# Patient Record
Sex: MALE | Race: BLACK OR AFRICAN AMERICAN | Employment: FULL TIME | URBAN - METROPOLITAN AREA
[De-identification: names, ages, dates, MRNs, and addresses within clinical notes are randomized per-mention and may not be internally consistent; named-entity substitution may affect disease eponyms.]

---

## 2017-02-08 ENCOUNTER — HOSPITAL ENCOUNTER (EMERGENCY)
Age: 52
Discharge: HOME | End: 2017-02-08
Attending: INTERNAL MEDICINE
Payer: COMMERCIAL

## 2017-02-08 VITALS
RESPIRATION RATE: 18 BRPM | WEIGHT: 165 LBS | TEMPERATURE: 99.6 F | SYSTOLIC BLOOD PRESSURE: 138 MMHG | HEART RATE: 94 BPM | OXYGEN SATURATION: 100 % | HEIGHT: 67 IN | DIASTOLIC BLOOD PRESSURE: 78 MMHG | BODY MASS INDEX: 25.9 KG/M2

## 2017-02-08 DIAGNOSIS — R11.2 NAUSEA, VOMITING AND DIARRHEA: Primary | ICD-10-CM

## 2017-02-08 DIAGNOSIS — R19.7 NAUSEA, VOMITING AND DIARRHEA: Primary | ICD-10-CM

## 2017-02-08 LAB
ALBUMIN SERPL BCP-MCNC: 3.7 G/DL (ref 3.4–5)
ALBUMIN/GLOB SERPL: 0.9 {RATIO} (ref 0.8–1.7)
ALP SERPL-CCNC: 73 U/L (ref 45–117)
ALT SERPL-CCNC: 30 U/L (ref 16–61)
ANION GAP BLD CALC-SCNC: 7 MMOL/L (ref 3–18)
APPEARANCE UR: CLEAR
AST SERPL W P-5'-P-CCNC: 22 U/L (ref 15–37)
BACTERIA URNS QL MICRO: ABNORMAL /HPF
BASOPHILS # BLD AUTO: 0 K/UL (ref 0–0.06)
BASOPHILS # BLD: 0 % (ref 0–2)
BILIRUB SERPL-MCNC: 0.6 MG/DL (ref 0.2–1)
BILIRUB UR QL: NEGATIVE
BUN SERPL-MCNC: 17 MG/DL (ref 7–18)
BUN/CREAT SERPL: 11 (ref 12–20)
CALCIUM SERPL-MCNC: 8.9 MG/DL (ref 8.5–10.1)
CHLORIDE SERPL-SCNC: 101 MMOL/L (ref 100–108)
CO2 SERPL-SCNC: 30 MMOL/L (ref 21–32)
COLOR UR: YELLOW
CREAT SERPL-MCNC: 1.54 MG/DL (ref 0.6–1.3)
DIFFERENTIAL METHOD BLD: ABNORMAL
EOSINOPHIL # BLD: 0 K/UL (ref 0–0.4)
EOSINOPHIL NFR BLD: 0 % (ref 0–5)
EPITH CASTS URNS QL MICRO: ABNORMAL /LPF (ref 0–5)
ERYTHROCYTE [DISTWIDTH] IN BLOOD BY AUTOMATED COUNT: 12.5 % (ref 11.6–14.5)
GLOBULIN SER CALC-MCNC: 4.3 G/DL (ref 2–4)
GLUCOSE SERPL-MCNC: 101 MG/DL (ref 74–99)
GLUCOSE UR STRIP.AUTO-MCNC: NEGATIVE MG/DL
HCT VFR BLD AUTO: 41.1 % (ref 36–48)
HGB BLD-MCNC: 13.8 G/DL (ref 13–16)
HGB UR QL STRIP: NEGATIVE
KETONES UR QL STRIP.AUTO: ABNORMAL MG/DL
LEUKOCYTE ESTERASE UR QL STRIP.AUTO: ABNORMAL
LYMPHOCYTES # BLD AUTO: 13 % (ref 21–52)
LYMPHOCYTES # BLD: 0.6 K/UL (ref 0.9–3.6)
MCH RBC QN AUTO: 29.9 PG (ref 24–34)
MCHC RBC AUTO-ENTMCNC: 33.6 G/DL (ref 31–37)
MCV RBC AUTO: 89.2 FL (ref 74–97)
MONOCYTES # BLD: 0.3 K/UL (ref 0.05–1.2)
MONOCYTES NFR BLD AUTO: 6 % (ref 3–10)
NEUTS SEG # BLD: 3.8 K/UL (ref 1.8–8)
NEUTS SEG NFR BLD AUTO: 81 % (ref 40–73)
NITRITE UR QL STRIP.AUTO: NEGATIVE
PH UR STRIP: 8 [PH] (ref 5–8)
PLATELET # BLD AUTO: 180 K/UL (ref 135–420)
PMV BLD AUTO: 10.7 FL (ref 9.2–11.8)
POTASSIUM SERPL-SCNC: 3.9 MMOL/L (ref 3.5–5.5)
PROT SERPL-MCNC: 8 G/DL (ref 6.4–8.2)
PROT UR STRIP-MCNC: 30 MG/DL
RBC # BLD AUTO: 4.61 M/UL (ref 4.7–5.5)
RBC #/AREA URNS HPF: ABNORMAL /HPF (ref 0–5)
SODIUM SERPL-SCNC: 138 MMOL/L (ref 136–145)
SP GR UR REFRACTOMETRY: >1.03 (ref 1–1.03)
UROBILINOGEN UR QL STRIP.AUTO: 2 EU/DL (ref 0.2–1)
WBC # BLD AUTO: 4.8 K/UL (ref 4.6–13.2)
WBC URNS QL MICRO: ABNORMAL /HPF (ref 0–5)

## 2017-02-08 PROCEDURE — 74011250636 HC RX REV CODE- 250/636: Performed by: PHYSICIAN ASSISTANT

## 2017-02-08 PROCEDURE — 80053 COMPREHEN METABOLIC PANEL: CPT | Performed by: PHYSICIAN ASSISTANT

## 2017-02-08 PROCEDURE — 99283 EMERGENCY DEPT VISIT LOW MDM: CPT

## 2017-02-08 PROCEDURE — 81001 URINALYSIS AUTO W/SCOPE: CPT | Performed by: INTERNAL MEDICINE

## 2017-02-08 PROCEDURE — 85025 COMPLETE CBC W/AUTO DIFF WBC: CPT | Performed by: PHYSICIAN ASSISTANT

## 2017-02-08 PROCEDURE — 96361 HYDRATE IV INFUSION ADD-ON: CPT

## 2017-02-08 PROCEDURE — 96374 THER/PROPH/DIAG INJ IV PUSH: CPT

## 2017-02-08 RX ORDER — ONDANSETRON 2 MG/ML
4 INJECTION INTRAMUSCULAR; INTRAVENOUS
Status: COMPLETED | OUTPATIENT
Start: 2017-02-08 | End: 2017-02-08

## 2017-02-08 RX ORDER — ONDANSETRON 4 MG/1
4 TABLET, ORALLY DISINTEGRATING ORAL
Qty: 12 TAB | Refills: 0 | Status: SHIPPED | OUTPATIENT
Start: 2017-02-08 | End: 2018-09-25

## 2017-02-08 RX ADMIN — SODIUM CHLORIDE 1000 ML: 900 INJECTION, SOLUTION INTRAVENOUS at 09:47

## 2017-02-08 RX ADMIN — ONDANSETRON 4 MG: 2 INJECTION INTRAMUSCULAR; INTRAVENOUS at 09:47

## 2017-02-08 NOTE — DISCHARGE INSTRUCTIONS
Nausea and Vomiting: Care Instructions  Your Care Instructions    When you are nauseated, you may feel weak and sweaty and notice a lot of saliva in your mouth. Nausea often leads to vomiting. Most of the time you do not need to worry about nausea and vomiting, but they can be signs of other illnesses. Two common causes of nausea and vomiting are stomach flu and food poisoning. Nausea and vomiting from viral stomach flu will usually start to improve within 24 hours. Nausea and vomiting from food poisoning may last from 12 to 48 hours. The doctor has checked you carefully, but problems can develop later. If you notice any problems or new symptoms, get medical treatment right away. Follow-up care is a key part of your treatment and safety. Be sure to make and go to all appointments, and call your doctor if you are having problems. It's also a good idea to know your test results and keep a list of the medicines you take. How can you care for yourself at home? · To prevent dehydration, drink plenty of fluids, enough so that your urine is light yellow or clear like water. Choose water and other caffeine-free clear liquids until you feel better. If you have kidney, heart, or liver disease and have to limit fluids, talk with your doctor before you increase the amount of fluids you drink. · Rest in bed until you feel better. · When you are able to eat, try clear soups, mild foods, and liquids until all symptoms are gone for 12 to 48 hours. Other good choices include dry toast, crackers, cooked cereal, and gelatin dessert, such as Jell-O. When should you call for help? Call 911 anytime you think you may need emergency care. For example, call if:  · You passed out (lost consciousness). Call your doctor now or seek immediate medical care if:  · You have symptoms of dehydration, such as:  ¨ Dry eyes and a dry mouth. ¨ Passing only a little dark urine.   ¨ Feeling thirstier than usual.  · You have new or worsening belly pain. · You have a new or higher fever. · You vomit blood or what looks like coffee grounds. Watch closely for changes in your health, and be sure to contact your doctor if:  · You have ongoing nausea and vomiting. · Your vomiting is getting worse. · Your vomiting lasts longer than 2 days. · You are not getting better as expected. Where can you learn more? Go to http://sho-andre.info/. Enter 25 [id number] in the search box to learn more about \"Nausea and Vomiting: Care Instructions. \"  Current as of: May 27, 2016  Content Version: 11.1  © 7339-3081 [employer]. Care instructions adapted under license by [employer] (which disclaims liability or warranty for this information). If you have questions about a medical condition or this instruction, always ask your healthcare professional. [address] any warranty or liability for your use of this information. Diarrhea: Care Instructions  Your Care Instructions    Diarrhea is loose, watery stools (bowel movements). The exact cause is often hard to find. Sometimes diarrhea is your body's way of getting rid of what caused an upset stomach. Viruses, food poisoning, and many medicines can cause diarrhea. Some people get diarrhea in response to emotional stress, anxiety, or certain foods. Almost everyone has diarrhea now and then. It usually isn't serious, and your stools will return to normal soon. The important thing to do is replace the fluids you have lost, so you can prevent dehydration. The doctor has checked you carefully, but problems can develop later. If you notice any problems or new symptoms, get medical treatment right away. Follow-up care is a key part of your treatment and safety. Be sure to make and go to all appointments, and call your doctor if you are having problems. It's also a good idea to know your test results and keep a list of the medicines you take.   How can you care for yourself at home? · Watch for signs of dehydration, which means your body has lost too much water. Dehydration is a serious condition and should be treated right away. Signs of dehydration are:  ¨ Increasing thirst and dry eyes and mouth. ¨ Feeling faint or lightheaded. ¨ Darker urine, and a smaller amount of urine than normal.  · To prevent dehydration, drink plenty of fluids, enough so that your urine is light yellow or clear like water. Choose water and other caffeine-free clear liquids until you feel better. If you have kidney, heart, or liver disease and have to limit fluids, talk with your doctor before you increase the amount of fluids you drink. · Begin eating small amounts of mild foods the next day, if you feel like it. ¨ Try yogurt that has live cultures of Lactobacillus. (Check the label.)  ¨ Avoid spicy foods, fruits, alcohol, and caffeine until 48 hours after all symptoms are gone. ¨ Avoid chewing gum that contains sorbitol. ¨ Avoid dairy products (except for yogurt with Lactobacillus) while you have diarrhea and for 3 days after symptoms are gone. · The doctor may recommend that you take over-the-counter medicine, such as loperamide (Imodium), if you still have diarrhea after 6 hours. Read and follow all instructions on the label. Do not use this medicine if you have bloody diarrhea, a high fever, or other signs of serious illness. Call your doctor if you think you are having a problem with your medicine. When should you call for help? Call 911 anytime you think you may need emergency care. For example, call if:  · You passed out (lost consciousness). · Your stools are maroon or very bloody. Call your doctor now or seek immediate medical care if:  · You are dizzy or lightheaded, or you feel like you may faint. · Your stools are black and look like tar, or they have streaks of blood. · You have new or worse belly pain.   · You have symptoms of dehydration, such as:  ¨ Dry eyes and a dry mouth. ¨ Passing only a little dark urine. ¨ Feeling thirstier than usual.  · You have a new or higher fever. Watch closely for changes in your health, and be sure to contact your doctor if:  · Your diarrhea is getting worse. · You see pus in the diarrhea. · You are not getting better after 2 days (48 hours). Where can you learn more? Go to http://sho-andre.info/. Enter S965 in the search box to learn more about \"Diarrhea: Care Instructions. \"  Current as of: May 27, 2016  Content Version: 11.1  © 1260-2750 [employer]. Care instructions adapted under license by [employer] (which disclaims liability or warranty for this information). If you have questions about a medical condition or this instruction, always ask your healthcare professional. [address] any warranty or liability for your use of this information.

## 2017-02-08 NOTE — ED TRIAGE NOTES
Patient reports he has a headache, vomiting. Sepsis Screening completed    (  )Patient meets SIRS criteria. (x  )Patient does not meet SIRS criteria.       SIRS Criteria is achieved when two or more of the following are present   Temperature < 96.8°F (36°C) or > 100.9°F (38.3°C)   Heart Rate > 90 beats per minute   Respiratory Rate > 20 beats per minute   WBC count > 12,000 or <4,000 or > 10% bands

## 2017-02-08 NOTE — ED PROVIDER NOTES
[address]  EMERGENCY DEPARTMENT HISTORY AND PHYSICAL EXAM       Date: 2/8/2017   Patient Name: Trace Mason   YOB: 1965  Medical Record Number: [id number]    History of Presenting Illness     Chief Complaint   Patient presents with    Vomiting    Headache        History Provided By:  patient    Additional History:   9:13 AM  Trace Mason is a 46 y.o. male with a h/o HTN who presents to the emergency department C/O near syncope and dizziness onset yesterday. Associated sx include HA, N/V/D, body aches, and generalized, intermittent abd pain. Pt has had contact with sick family members with similar sx. Denies urinary sx, fever, hematuria, hematochezia, melena, and hematemesis. Primary Care Provider: None   Specialist:    Past History     Past Medical History:   Past Medical History   Diagnosis Date    Hypertension         Past Surgical History:   History reviewed. No pertinent past surgical history. Family History:   History reviewed. No pertinent family history. Social History:   Social History   Substance Use Topics    Smoking status: Never Smoker    Smokeless tobacco: None    Alcohol use No        Allergies: Allergies   Allergen Reactions    Aspirin Hives        Review of Systems   Review of Systems   Constitutional: Negative for fever. Gastrointestinal: Positive for abdominal pain (generalized), diarrhea, nausea and vomiting. Genitourinary: Negative for decreased urine volume, difficulty urinating, dysuria, frequency, hematuria and urgency. Neurological: Positive for syncope (near) and headaches. All other systems reviewed and are negative. Physical Exam  Vitals:    02/08/17 0858   BP: 140/81   Pulse: 94   Resp: 16   Temp: 99.6 °F (37.6 °C)   SpO2: 100%   Weight: 74.8 kg (165 lb)   Height: 5' 7\" (1.702 m)       Physical Exam   Nursing note and vitals reviewed. Vital signs and nursing notes reviewed. CONSTITUTIONAL: Alert. Well-appearing; well-nourished; in no apparent distress. HEAD: Normocephalic; atraumatic. EYES: PERRL; Conjunctiva clear. ENT: TM's normal. External ear normal. Normal nose; no rhinorrhea. Normal pharynx. Moist mucus membranes. NECK: Supple; FROM without difficulty, non-tender; no cervical lymphadenopathy. CV: Normal S1, S2; no murmurs, rubs, or gallops. No chest wall tenderness. RESPIRATORY: Normal chest excursion with respiration; breath sounds clear and equal bilaterally; no wheezes, rhonchi, or rales. GI: Normal bowel sounds; non-distended; non-tender; no guarding or rigidity; no palpable organomegaly. No CVA tenderness. EXT: Normal ROM in all four extremities; non-tender to palpation. SKIN: Normal for age and race; warm; dry; good turgor; no apparent lesions or exudate. NEURO: A & O x3. PSYCH:  Mood and affect appropriate.        Diagnostic Study Results     Labs -      Recent Results (from the past 12 hour(s))   URINALYSIS W/ RFLX MICROSCOPIC    Collection Time: 02/08/17  9:10 AM   Result Value Ref Range    Color YELLOW      Appearance CLEAR      Specific gravity >1.030 (H) 1.005 - 1.030    pH (UA) 8.0 5.0 - 8.0      Protein 30 (A) NEG mg/dL    Glucose NEGATIVE  NEG mg/dL    Ketone TRACE (A) NEG mg/dL    Bilirubin NEGATIVE  NEG      Blood NEGATIVE  NEG      Urobilinogen 2.0 (H) 0.2 - 1.0 EU/dL    Nitrites NEGATIVE  NEG      Leukocyte Esterase TRACE (A) NEG     URINE MICROSCOPIC ONLY    Collection Time: 02/08/17  9:10 AM   Result Value Ref Range    WBC 0 to 3 0 - 5 /hpf    RBC 0 to 3 0 - 5 /hpf    Epithelial cells FEW 0 - 5 /lpf    Bacteria 1+ (A) NEG /hpf   CBC WITH AUTOMATED DIFF    Collection Time: 02/08/17  9:20 AM   Result Value Ref Range    WBC 4.8 4.6 - 13.2 K/uL    RBC 4.61 (L) 4.70 - 5.50 M/uL    HGB 13.8 13.0 - 16.0 g/dL    HCT 41.1 36.0 - 48.0 %    MCV 89.2 74.0 - 97.0 FL    MCH 29.9 24.0 - 34.0 PG    MCHC 33.6 31.0 - 37.0 g/dL    RDW 12.5 11.6 - 14.5 %    PLATELET 478 511 - 420 K/uL    MPV 10.7 9.2 - 11.8 FL    NEUTROPHILS 81 (H) 40 - 73 %    LYMPHOCYTES 13 (L) 21 - 52 %    MONOCYTES 6 3 - 10 %    EOSINOPHILS 0 0 - 5 %    BASOPHILS 0 0 - 2 %    ABS. NEUTROPHILS 3.8 1.8 - 8.0 K/UL    ABS. LYMPHOCYTES 0.6 (L) 0.9 - 3.6 K/UL    ABS. MONOCYTES 0.3 0.05 - 1.2 K/UL    ABS. EOSINOPHILS 0.0 0.0 - 0.4 K/UL    ABS. BASOPHILS 0.0 0.0 - 0.06 K/UL    DF AUTOMATED     METABOLIC PANEL, COMPREHENSIVE    Collection Time: 02/08/17  9:20 AM   Result Value Ref Range    Sodium 138 136 - 145 mmol/L    Potassium 3.9 3.5 - 5.5 mmol/L    Chloride 101 100 - 108 mmol/L    CO2 30 21 - 32 mmol/L    Anion gap 7 3.0 - 18 mmol/L    Glucose 101 (H) 74 - 99 mg/dL    BUN 17 7.0 - 18 MG/DL    Creatinine 1.54 (H) 0.6 - 1.3 MG/DL    BUN/Creatinine ratio 11 (L) 12 - 20      GFR est AA 58 (L) >60 ml/min/1.73m2    GFR est non-AA 48 (L) >60 ml/min/1.73m2    Calcium 8.9 8.5 - 10.1 MG/DL    Bilirubin, total 0.6 0.2 - 1.0 MG/DL    ALT (SGPT) 30 16 - 61 U/L    AST (SGOT) 22 15 - 37 U/L    Alk. phosphatase 73 45 - 117 U/L    Protein, total 8.0 6.4 - 8.2 g/dL    Albumin 3.7 3.4 - 5.0 g/dL    Globulin 4.3 (H) 2.0 - 4.0 g/dL    A-G Ratio 0.9 0.8 - 1.7         Radiologic Studies -  No orders to display       Medical Decision Making   I am the first provider for this patient. I reviewed the vital signs, available nursing notes, past medical history, past surgical history, family history and social history. Vital Signs-Reviewed the patient's vital signs. Patient Vitals for the past 12 hrs:   Temp Pulse Resp BP SpO2   02/08/17 0858 99.6 °F (37.6 °C) 94 16 140/81 100 %       Pulse Oximetry Analysis - Normal 100% on room air. Old Medical Records: Nursing notes.      ED Course:      Medications Given in the ED:  Medications   sodium chloride 0.9 % bolus infusion 1,000 mL (1,000 mL IntraVENous New Bag 2/8/17 0947)   ondansetron (ZOFRAN) injection 4 mg (4 mg IntraVENous Given 2/8/17 0947)        PROGRESS NOTE:  9:13 AM   Initial assessment performed. Written by Kamron Albarado, ED scribe, as dictated by Caden Andrews PA-C. PROGRESS NOTE:   10:10 AM  Pt has been re-examined by Caden Andrews PA-C. Pt is feeling better already after 500 cc of fluids and Zofran. Abd exam still benign. Will allow liter to finish and discharge home with Zofran and encourage good hand hygiene as this is likely viral gastroenteritis. He denies any urinary sx. UA shows trace leuk est, but no white cells, therefore tx not indicated. Work note provided. Written by Kamron Albarado, ED Scribe, as dictated by Caden Andrews PA-C. Discharge Note:  10:10 AM  Patient has no new complaints, changes, or physical findings. Care plan outlined and precautions discussed. Results were reviewed with the patient. All medications were reviewed with the patient; will d/c home with Zofran. All of pt's questions and concerns were addressed. Patient was instructed and agrees to follow up with PCP, as well as to return to the ED upon further deterioration. Patient is ready to go home. Diagnosis   Clinical Impression:   1. Nausea, vomiting and diarrhea           Follow-up Information     Follow up With Details Comments Contact Info    Malaika Keep, DO Schedule an appointment as soon as possible for a visit in 2 days Follow up with your PCP. [address] [facility]      [facility] EMERGENCY DEPT  As needed, If symptoms worsen [address] [place]  [phone]          Current Discharge Medication List      START taking these medications    Details   ondansetron (ZOFRAN ODT) 4 mg disintegrating tablet Take 1 Tab by mouth every eight (8) hours as needed for Nausea. Qty: 12 Tab, Refills: 0             _______________________________   Attestations: This note is prepared by Kamron Albarado, acting as a Scribe for Caden Andrews PA-C on 9:11 AM on 2/8/2017 .     Caden Andrews PA-C: The scribe's documentation has been prepared under my direction and personally reviewed by me in its entirety. _______________________________   I was personally available for consultation in the emergency department. I have reviewed the chart and agree with the documentation recorded by the [facility], including the assessment, treatment plan, and disposition.

## 2017-02-08 NOTE — LETTER
[facility] [place] 
[facility] EMERGENCY DEPT 
[address] [address] 
[phone] Work/School Note Date: 2/8/2017 To Whom It May concern: 
 
Sanjuana Painting was seen and treated today in the emergency room by the following provider(s): 
Attending Provider: Hong Whitehead MD 
Physician Assistant: Ivan Dean. Sanjuana Painting may return to work on Friday, 2/10/17.  
 
Sincerely, 
 
 
 
 
Jeanie Clarke PA-C

## 2018-09-25 ENCOUNTER — APPOINTMENT (OUTPATIENT)
Dept: GENERAL RADIOLOGY | Age: 53
End: 2018-09-25
Attending: PHYSICIAN ASSISTANT
Payer: OTHER MISCELLANEOUS

## 2018-09-25 ENCOUNTER — HOSPITAL ENCOUNTER (EMERGENCY)
Age: 53
Discharge: HOME | End: 2018-09-25
Attending: EMERGENCY MEDICINE | Admitting: EMERGENCY MEDICINE
Payer: OTHER MISCELLANEOUS

## 2018-09-25 VITALS
RESPIRATION RATE: 18 BRPM | DIASTOLIC BLOOD PRESSURE: 91 MMHG | BODY MASS INDEX: 28.25 KG/M2 | WEIGHT: 180 LBS | OXYGEN SATURATION: 100 % | HEIGHT: 67 IN | HEART RATE: 79 BPM | TEMPERATURE: 97.9 F | SYSTOLIC BLOOD PRESSURE: 175 MMHG

## 2018-09-25 DIAGNOSIS — S96.912A STRAIN OF LEFT ANKLE, INITIAL ENCOUNTER: ICD-10-CM

## 2018-09-25 DIAGNOSIS — S92.355A CLOSED NONDISPLACED FRACTURE OF FIFTH METATARSAL BONE OF LEFT FOOT, INITIAL ENCOUNTER: Primary | ICD-10-CM

## 2018-09-25 PROCEDURE — 75810000053 HC SPLINT APPLICATION

## 2018-09-25 PROCEDURE — 73630 X-RAY EXAM OF FOOT: CPT

## 2018-09-25 PROCEDURE — 99283 EMERGENCY DEPT VISIT LOW MDM: CPT

## 2018-09-25 PROCEDURE — 73610 X-RAY EXAM OF ANKLE: CPT

## 2018-09-25 PROCEDURE — 74011250636 HC RX REV CODE- 250/636: Performed by: PHYSICIAN ASSISTANT

## 2018-09-25 RX ORDER — FENTANYL CITRATE 50 UG/ML
50 INJECTION, SOLUTION INTRAMUSCULAR; INTRAVENOUS ONCE
Status: COMPLETED | OUTPATIENT
Start: 2018-09-25 | End: 2018-09-25

## 2018-09-25 RX ORDER — HYDROCODONE BITARTRATE AND ACETAMINOPHEN 5; 325 MG/1; MG/1
1 TABLET ORAL
Qty: 20 TAB | Refills: 0 | Status: SHIPPED | OUTPATIENT
Start: 2018-09-25

## 2018-09-25 RX ADMIN — FENTANYL CITRATE 50 MCG: 50 INJECTION, SOLUTION INTRAMUSCULAR; INTRAVENOUS at 13:07

## 2018-09-25 NOTE — ED TRIAGE NOTES
C/o left ankle pain, states he was approx 3 steps up on a ladder, ladder gave way and pt fell, denies hitting his head, scratches noted to left side of neck.

## 2018-09-25 NOTE — DISCHARGE INSTRUCTIONS
Metatarsal Fracture: Care Instructions  Your Care Instructions    A metatarsal fracture is a break or a thin, hairline crack in one of the metatarsal bones of the foot. This type of fracture usually happens from repeated stress on the bones of the foot. Or it can happen when a person jumps or changes direction quickly and twists his or her foot or ankle the wrong way. This fracture is common among dancers because their work involves a lot of jumping, and balancing and turning on one foot. Treatment depends on how bad the fracture is and where the fracture is on the bone. You may or may not have had surgery. Your doctor may have put your foot in a cast or splint to keep it stable. You may have been given crutches to use to keep weight off your foot. A metatarsal fracture may take from 6 weeks to several months to heal. It is important to give your foot time to heal completely, so that you do not hurt it again. Do not return to your usual activities until your doctor says you can. Your doctor may suggest that you get physical therapy to help regain strength and range of motion in your foot. You heal best when you take good care of yourself. Eat a variety of healthy foods, and don't smoke. Follow-up care is a key part of your treatment and safety. Be sure to make and go to all appointments, and call your doctor if you are having problems. It is also a good idea to know your test results and keep a list of the medicines you take. How can you care for yourself at home? · Be safe with medicines. Read and follow all instructions on the label. ¨ If your doctor gave you a prescription medicine for pain, take it as prescribed. ¨ If you are not taking a prescription pain medicine, ask your doctor if you can take an over-the-counter medicine. · Follow your doctor's instructions about how much weight you can put on your foot and when you can go back to your usual activities.  If you were given crutches, use them as directed. · Put ice or a cold pack on your foot for 10 to 20 minutes at a time. Try to do this every 1 to 2 hours for the next 3 days (when you are awake) or until the swelling goes down. Put a thin cloth between the ice and your skin. · Prop up your foot on a pillow when you ice it or anytime you sit or lie down for the next 3 days. Try to keep it above the level of your heart. This will help reduce swelling. Cast and splint care  · If your foot is in a cast or splint, follow the cast or splint care instructions your doctor gives you. If you have a removable fiberglass walking cast or a splint, do not take it off unless your doctor tells you to. · Keep your cast or splint dry. If you have a removable fiberglass walking cast or a splint, ask your doctor if it is okay to remove it to bathe. Your doctor may want you to keep it on as much as possible. · If you're told to keep your cast or splint on, tape a sheet of plastic to cover it when you bathe. Or ask your doctor about products that can help keep a cast or splint dry. Water under the cast or splint can cause your skin to itch and hurt. · Never cut your cast or stick anything down it to scratch an itch on your leg. When should you call for help? Call your doctor now or seek immediate medical care if:    · You have problems with your cast or splint. For example:  ¨ The skin under the cast or splint is burning or stinging. ¨ The cast or splint feels too tight. ¨ There is a lot of swelling near the cast or splint. (Some swelling is normal.)  ¨ You have a new fever.   ¨ There is drainage or a bad smell coming from the cast or splint.     · You have increased or severe pain.     · You have tingling, weakness, or numbness in your foot and toes.     · You cannot move your toes.     · Your foot turns cold or changes color.    Watch closely for changes in your health, and be sure to contact your doctor if:    · The pain does not get better day by day.     · You do not get better as expected. Where can you learn more? Go to http://sho-andre.info/. Enter ([phone] in the search box to learn more about \"Metatarsal Fracture: Care Instructions. \"  Current as of: November 29, 2017  Content Version: 11.7  © 8317-0718 [employer]. Care instructions adapted under license by [employer] (which disclaims liability or warranty for this information). If you have questions about a medical condition or this instruction, always ask your healthcare professional. [address] any warranty or liability for your use of this information.

## 2018-09-25 NOTE — LETTER
[facility] [place] 
THE [facility] EMERGENCY DEPT 
[address] [address] 
[phone] Work/School Note Date: 9/25/2018 To Whom It May concern: 
 
Aaron Sprain was seen and treated today in the emergency room by the following provider(s): 
Attending Provider: Poli Pearson MD 
Physician Assistant: KAROLINA Collins. Aaron Riddleain may return to work on 10/1/18.  
 
Sincerely, 
 
 
 
Todd Purcell PA-C

## 2018-09-25 NOTE — ED PROVIDER NOTES
EMERGENCY DEPARTMENT HISTORY AND PHYSICAL EXAM 
 
Date: 9/25/2018 Patient Name: Leoncio Elmore History of Presenting Illness Chief Complaint Patient presents with  Ankle Injury History Provided By: Patient Chief Complaint: ankle pain Duration: 1 Hours Timing:  Acute Location: left Severity: 10 out of 10 Associated Symptoms: left ankle ecchymosis and swelling Additional History (Context):  
1:00 PM 
Leoncio Elmore is a 46 y.o. male who presents to the emergency department C/O left ankle pain onset 1 hour ago. Associated symptoms include left ankle ecchymosis and swelling. Report he was standing approximately 3 steps on a ladder when it gave out and he fell with the left foot twisted inward. Pt denies fever and any other Sx or complaints. PCP: None Past History Past Medical History: 
Past Medical History:  
Diagnosis Date  Hypertension Past Surgical History: No past surgical history on file. Family History: No family history on file. Social History: 
Social History Substance Use Topics  Smoking status: Never Smoker  Smokeless tobacco: Not on file  Alcohol use No  
 
 
Allergies: Allergies Allergen Reactions  Aspirin Hives Review of Systems Review of Systems Constitutional: Negative for fever. Musculoskeletal: Positive for arthralgias (left ankle pain). Skin:  
     (+) left ankle ecchymosis  
(+) left ankle swelling All other systems reviewed and are negative. Physical Exam  
 
Vitals:  
 09/25/18 1231 BP: (!) 154/101 Pulse: 94 Resp: 18 Temp: 97.9 °F (36.6 °C) SpO2: 99% Weight: 81.6 kg (180 lb) Height: 5' 7\" (1.702 m) Physical Exam  
Constitutional: He is oriented to person, place, and time. He appears well-developed and well-nourished. Mild pain distress HENT:  
Head: Normocephalic and atraumatic. Eyes: Conjunctivae and EOM are normal. Pupils are equal, round, and reactive to light. Neck: Normal range of motion. Neck supple. Musculoskeletal: Normal range of motion. He exhibits tenderness. He exhibits no edema or deformity. Left knee: Normal.  
     Left ankle: He exhibits swelling. He exhibits normal range of motion, no ecchymosis, no deformity, no laceration and normal pulse. Tenderness. Lateral malleolus tenderness found. Achilles tendon normal.  
     Left foot: There is tenderness, bony tenderness and swelling. There is no crepitus, no deformity and no laceration. Feet: 
 
LLE: NVI Neurological: He is alert and oriented to person, place, and time. Skin: Skin is warm and dry. Psychiatric: He has a normal mood and affect. His behavior is normal.  
Nursing note and vitals reviewed. Diagnostic Study Results Labs - No results found for this or any previous visit (from the past 12 hour(s)). Radiologic Studies -  
XR ANKLE LT MIN 3 V    (Results Pending) XR FOOT LT MIN 3 V    (Results Pending) 1:57 PM 
RADIOLOGY FINDINGS Lt Ankle X-ray shows proximal 5th metatarsal fracture nondisplaced. Otherwise no acute Pending review by Radiologist 
Recorded by Roxy Philip ED Scribe, as dictated by Gopal Calderon PA-C 
 
1:57 PM 
RADIOLOGY FINDINGS Lt Foot X-ray shows proximal 5th metatarsal fracture nondisplaced. Otherwise no acute Pending review by Radiologist 
Recorded by Roxy Philip ED Scribe, as dictated by Gopal Calderon PA-C 
 
CT Results  (Last 48 hours) None CXR Results  (Last 48 hours) None Medications given in the ED- Medications  
fentaNYL citrate (PF) injection 50 mcg (50 mcg IntraNASal Given 9/25/18 4477) Medical Decision Making I am the first provider for this patient. I reviewed the vital signs, available nursing notes, past medical history, past surgical history, family history and social history. Vital Signs-Reviewed the patient's vital signs.  
 
Pulse Oximetry Analysis - 99% on RA  
 
 Records Reviewed: Nursing Notes and Old Medical Records Procedures: 
Procedures Procedure Note - Splint Assessement: 
Performed by: Xavi Spear PA-C Short leg splint to left leg assessed. Neurovascularly intact. The procedure took 1-15 minutes, and pt tolerated well. Written by Griselda Peed, ED Scribe, as dictated by Xavi Spear PA-C. 
 
ED Course:  
1:00 PM Initial assessment performed. The patients presenting problems have been discussed, and they are in agreement with the care plan formulated and outlined with them. I have encouraged them to ask questions as they arise throughout their visit. Diagnosis and Disposition DISCHARGE NOTE: 
1:58 PM 
Sandi Genao  results have been reviewed with him. He has been counseled regarding his diagnosis, treatment, and plan. He verbally conveys understanding and agreement of the signs, symptoms, diagnosis, treatment and prognosis and additionally agrees to follow up as discussed. He also agrees with the care-plan and conveys that all of his questions have been answered. I have also provided discharge instructions for him that include: educational information regarding their diagnosis and treatment, and list of reasons why they would want to return to the ED prior to their follow-up appointment, should his condition change. He has been provided with education for proper emergency department utilization. CLINICAL IMPRESSION: 
 
1. Closed nondisplaced fracture of fifth metatarsal bone of left foot, initial encounter 2. Strain of left ankle, initial encounter PLAN: 
1. D/C Home 2. Current Discharge Medication List  
  
START taking these medications Details HYDROcodone-acetaminophen (NORCO) 5-325 mg per tablet Take 1 Tab by mouth every four (4) hours as needed for Pain. Max Daily Amount: 6 Tabs. Qty: 20 Tab, Refills: 0  Associated Diagnoses: Closed nondisplaced fracture of fifth metatarsal bone of left foot, initial encounter; Strain of left ankle, initial encounter 3. Follow-up Information Follow up With Details Comments Contact Info [facility] OCCUPATIONAL MED Schedule an appointment as soon as possible for a visit For follow up with [address] [place] [address] 
[phone] [facility] EMERGENCY DEPT Go to As needed, as symptoms worsen [address] [place] [address] 
[phone]  
  
 
_______________________________ Attestations: This note is prepared by Sammi Gmoes, acting as Scribe for Todd Purcell PA-C. Todd Purcell PA-C:  The scribe's documentation has been prepared under my direction and personally reviewed by me in its entirety. I confirm that the note above accurately reflects all work, treatment, procedures, and medical decision making performed by me. 
_______________________________

## 2018-09-25 NOTE — ED NOTES
Splint done by ED Tech and RN, Yves Gleason and Wilfredo Malone RN. Girlfriend at bedside Discharge instructions reviewed with the patient with opportunity for questions given. The patient verbalized understanding. Patient armband removed and shredded. Patient in stable condition at time of discharge.

## 2018-09-25 NOTE — LETTER
[facility] [place] 
[facility] EMERGENCY DEPT 
[address] [address] 
[phone] Arthea Jim Date: 9/25/2018 Sex: male [address] [facility] [address] YOB: 1965 Age: 46 y.o. Occupational Medicine Return to Work Form          Date of Treatment:  9/25/2018 Diagnosis: ICD-10-CM ICD-9-CM 1. Closed nondisplaced fracture of fifth metatarsal bone of left foot, initial encounter S92.355A 825.25 HYDROcodone-acetaminophen (NORCO) 5-325 mg per tablet 2. Strain of left ankle, initial encounter S96.912A 845.00 HYDROcodone-acetaminophen (NORCO) 5-325 mg per tablet Instructions:  Employee must return copy of this report to Personnel and/or Supervisor. Patient Identification - Company Protocol:   
 []  Checked & Followed [x]  Not Available PART I:  Check Treatment [x]  X-ray   []  Lab  [x]  Discharge Instructions Given  [x]  Rx Given 
 []  Non-Prescription Meds  []  Sutures     []  EKG   
 []  Other (Comment):   
 
Part II:  Disposition 
 []  May Return to Regular Work Without Restrictions []  May Return to Restricted Duty as Below Explanation of RESTRICTIONS (Comment):    
 
 [x]  May NOT Return to Work until cleared by Referral Specialist or Occupational Medicine MD 
 
Part III:  Follow-Up Follow-up Information Follow up With Details Comments Contact Info [facility] OCCUPATIONAL MED Schedule an appointment as soon as possible for a visit For follow up with [address] [place] [address] [address] [place] [address] 
[phone] [facility] EMERGENCY DEPT Go to As needed, as symptoms worsen [address] [place] [address] 
[phone] Part IV: Was Workers Comp Supervisor Notified  []   Yes  [x]   No 
 
Arthea Jim was seen in the Emergency Department on 9/25/2018 by the following provider(s): 
Attending Provider: Mamta Barnard MD 
Physician Assistant: KAROLINA Luevano; ED Nurse: PLEASE CALL CASE FACILITATOR, OCCUPATIONAL MEDICINE  
[phone] TO SCHEDULE AN APPOINTMENT Referral Physicians: Emi Arnold MD 
[id number]. [address] [facility]. [address], [address]   [address], [address] Phone:  [phone]; Fax:  [phone] ORTHOPEDICS [address] [facility] [facility] [facility]. [address]    [address], [address] 
[phone] [facility] [address] [facility], [address] 
[phone]

## 2018-09-25 NOTE — LETTER
[facility] [place] 
THE [facility] EMERGENCY DEPT 
[address] [id number] [phone] Work/School Note Date: 9/25/2018 To Whom It May concern: 
 
Tao Perdomo was seen and treated today in the emergency room by the following provider(s): 
Attending Provider: Jude Joy MD 
Physician Assistant: KAROLINA Ugarte. Ms. Charanjit Sheikh may return to work on 9/26/18.  
 
Sincerely, 
 
 
 
 
Malini Ahn PA-C

## 2019-07-02 ENCOUNTER — HOSPITAL ENCOUNTER (OUTPATIENT)
Dept: LAB | Age: 54
Discharge: HOME | End: 2019-07-02
Payer: OTHER MISCELLANEOUS

## 2019-07-02 LAB
25(OH)D3 SERPL-MCNC: 23 NG/ML (ref 30–100)
CALCIUM SERPL-MCNC: 9 MG/DL (ref 8.5–10.1)

## 2019-07-02 PROCEDURE — 82310 ASSAY OF CALCIUM: CPT

## 2019-07-02 PROCEDURE — 36415 COLL VENOUS BLD VENIPUNCTURE: CPT

## 2019-07-02 PROCEDURE — 82306 VITAMIN D 25 HYDROXY: CPT

## 2019-07-03 LAB
FAX TO INFO,FAXT: NORMAL
FAX TO NUMBER,FAXN: NORMAL

## 2022-12-05 ENCOUNTER — APPOINTMENT (OUTPATIENT)
Dept: GENERAL RADIOLOGY | Age: 57
End: 2022-12-05
Attending: EMERGENCY MEDICINE

## 2022-12-05 ENCOUNTER — HOSPITAL ENCOUNTER (EMERGENCY)
Age: 57
Discharge: HOME | End: 2022-12-06
Attending: EMERGENCY MEDICINE

## 2022-12-05 DIAGNOSIS — S93.401A SPRAIN OF RIGHT ANKLE, UNSPECIFIED LIGAMENT, INITIAL ENCOUNTER: Primary | ICD-10-CM

## 2022-12-05 PROCEDURE — 99283 EMERGENCY DEPT VISIT LOW MDM: CPT

## 2022-12-05 RX ORDER — IBUPROFEN 400 MG/1
800 TABLET ORAL
Status: DISCONTINUED | OUTPATIENT
Start: 2022-12-05 | End: 2022-12-06 | Stop reason: HOSPADM

## 2022-12-06 ENCOUNTER — APPOINTMENT (OUTPATIENT)
Dept: GENERAL RADIOLOGY | Age: 57
End: 2022-12-06
Attending: EMERGENCY MEDICINE

## 2022-12-06 VITALS
HEIGHT: 67 IN | OXYGEN SATURATION: 100 % | WEIGHT: 180 LBS | TEMPERATURE: 97.3 F | BODY MASS INDEX: 28.25 KG/M2 | SYSTOLIC BLOOD PRESSURE: 172 MMHG | DIASTOLIC BLOOD PRESSURE: 78 MMHG | RESPIRATION RATE: 18 BRPM | HEART RATE: 78 BPM

## 2022-12-06 PROCEDURE — 73630 X-RAY EXAM OF FOOT: CPT

## 2022-12-06 PROCEDURE — 73590 X-RAY EXAM OF LOWER LEG: CPT

## 2022-12-06 PROCEDURE — 73610 X-RAY EXAM OF ANKLE: CPT

## 2022-12-06 NOTE — DISCHARGE INSTRUCTIONS
Follow-up with your primary care doctor or with orthopedics. Return to the ED for worsening symptoms or for other concerns.

## 2022-12-06 NOTE — ED PROVIDER NOTES
EMERGENCY DEPARTMENT HISTORY AND PHYSICAL EXAM    Date: 12/5/2022  Patient Name: Leonidas Higuera    History of Presenting Illness     Chief Complaint   Patient presents with    Foot Swelling       History Provided By: Patient     History Edilia Martinez):   10:35 PM  Leonidas Higuera is a 62 y.o. male with a PMHX of hypertension, left foot hardware  who presents to the emergency department (room Q4) C/O left foot swelling onset over the last several days. Associated sxs include patient pain and swelling. Pt denies any other sxs or complaints. Patient states that he does not know any new injury. He has no risk factors for DVT. He states he had a birthday party several days ago and does not remember the entire party. He is concerned that he may have injured it at that time. Chief Complaint: Left lower extremity swelling  Onset: Several days ago  Timing:  Acute  Context: Symptoms started spontaneously, symptoms have progressively worsened since onset  Location: Left leg  Quality: Sharp and Pressure  Severity: Moderate  Modifying Factors: Nothing makes it better, or worse. Associated Symptoms:  Pain and swelling of the left ankle    PCP: Butch Perez DO     Past History         Past Medical History:  Past Medical History:   Diagnosis Date    Hypertension        Past Surgical History:  No past surgical history on file. Family History:  No family history on file. Reviewed and non-contributory    Social History:  Social History     Tobacco Use    Smoking status: Never   Substance Use Topics    Alcohol use: No       Medications:  Current Facility-Administered Medications   Medication Dose Route Frequency Provider Last Rate Last Admin    ibuprofen (MOTRIN) tablet 800 mg  800 mg Oral NOW Cassidy Stanford MD         Current Outpatient Medications   Medication Sig Dispense Refill    HYDROcodone-acetaminophen (NORCO) 5-325 mg per tablet Take 1 Tab by mouth every four (4) hours as needed for Pain.  Max Daily Amount: 6 Tabs. 20 Tab 0       Allergies: Allergies   Allergen Reactions    Aspirin Hives       Social Determinants of Health:  Social Determinants of Health     Tobacco Use: Not on file   Alcohol Use: Not on file   Financial Resource Strain: Not on file   Food Insecurity: Not on file   Transportation Needs: Not on file   Physical Activity: Not on file   Stress: Not on file   Social Connections: Not on file   Intimate Partner Violence: Not on file   Depression: Not on file   Housing Stability: Not on file       Review of Systems      Review of Systems   Constitutional:  Negative for chills and fever. HENT:  Negative for rhinorrhea and sore throat. Eyes:  Negative for pain and visual disturbance. Respiratory:  Negative for chest tightness, shortness of breath and wheezing. Cardiovascular:  Negative for chest pain and palpitations. Gastrointestinal:  Negative for abdominal pain, diarrhea, nausea and vomiting. Musculoskeletal:  Positive for arthralgias, gait problem and joint swelling. Negative for myalgias. Skin:  Negative for rash and wound. Neurological:  Negative for speech difficulty, light-headedness and headaches. Psychiatric/Behavioral:  Negative for agitation and confusion. All other systems reviewed and are negative. Physical Exam     Vitals:    12/05/22 2219 12/05/22 2222   BP:  (!) 187/85   Pulse: 78    Resp: 18    Temp: 97.3 °F (36.3 °C)    SpO2: 100%    Weight: 81.6 kg (180 lb)    Height: 5' 7\" (1.702 m)        Physical Exam  Vitals and nursing note reviewed. Constitutional:       General: He is not in acute distress. Appearance: Normal appearance. He is normal weight. He is not ill-appearing. HENT:      Head: Normocephalic and atraumatic. Nose: Nose normal. No rhinorrhea. Mouth/Throat:      Mouth: Mucous membranes are moist.      Pharynx: No oropharyngeal exudate or posterior oropharyngeal erythema. Eyes:      Extraocular Movements: Extraocular movements intact. Conjunctiva/sclera: Conjunctivae normal.      Pupils: Pupils are equal, round, and reactive to light. Cardiovascular:      Rate and Rhythm: Normal rate and regular rhythm. Heart sounds: No murmur heard. No friction rub. No gallop. Pulmonary:      Effort: Pulmonary effort is normal. No respiratory distress. Breath sounds: Normal breath sounds. No wheezing, rhonchi or rales. Abdominal:      General: Bowel sounds are normal.      Palpations: Abdomen is soft. Tenderness: There is no abdominal tenderness. There is no guarding or rebound. Musculoskeletal:         General: No swelling or deformity. Cervical back: Normal range of motion and neck supple. No rigidity. Right lower leg: Normal.      Left lower leg: Swelling and tenderness present. No bony tenderness. No edema. Right ankle: Normal.      Left ankle: Swelling present. No deformity, ecchymosis or lacerations. Tenderness present. Decreased range of motion. Normal pulse. Left Achilles Tendon: No tenderness. Gonzalez's test negative. Right foot: Normal.      Left foot: Decreased range of motion. Normal capillary refill. Swelling and tenderness present. No deformity, bunion, Charcot foot, foot drop, prominent metatarsal heads, laceration, bony tenderness or crepitus. Normal pulse. Legs:    Lymphadenopathy:      Cervical: No cervical adenopathy. Skin:     General: Skin is warm and dry. Findings: No rash. Neurological:      General: No focal deficit present. Mental Status: He is alert and oriented to person, place, and time. Gait: Gait abnormal.      Comments: Limping gait   Psychiatric:         Mood and Affect: Mood normal.         Behavior: Behavior normal.       Diagnostic Study Results     Labs -  No results found for this or any previous visit (from the past 12 hour(s)).     Radiologic Studies -     2:14 AM  RADIOLOGY FINDINGS  Left foot x-ray shows no acute fracture or dislocation, no displaced hardware, fifth metatarsal screw in place. Interpreted by Rakesh Patterson MD.  Pending review by Radiologist    2:15 AM  RADIOLOGY FINDINGS  Left ankle x-ray shows no acute fracture or dislocation. Interpreted by Rakesh Patterson MD.  Pending review by Radiologist    2:15 AM  RADIOLOGY FINDINGS  Left tibia-fibula x-ray shows no acute fracture or dislocation. Interpreted by Rakesh Patterson MD.  Pending review by Radiologist    XR FOOT LT MIN 3 V    (Results Pending)   XR ANKLE LT MIN 3 V    (Results Pending)   XR TIB/FIB LT    (Results Pending)     CT Results  (Last 48 hours)      None          CXR Results  (Last 48 hours)      None            Medications given in the ED-  Medications   ibuprofen (MOTRIN) tablet 800 mg (has no administration in time range)       Procedures     Procedures    ED Course     I Rakesh Patterson MD) am the first provider for this patient. I reviewed the vital signs, available nursing notes, past medical history, past surgical history, family history and social history. Records Reviewed: Nursing Notes    Cardiac Monitor:  Rate: 78 bpm  Rhythm: sinus rhythm    Pulse Oximetry Analysis - 100% on RA    10:35 PM Initial assessment performed. The patients presenting problems have been discussed, and they are in agreement with the care plan formulated and outlined with them. I have encouraged them to ask questions as they arise throughout their visit. Medical Decision Making     Provider Notes (Medical Decision Making):   DDX: Pain, strain, fracture, dislocation    Discussion:  62 y.o. male with swelling of the left ankle and foot. He has a limping gait. He has unknown injury but does state that he does not remember all of the time from his birthday party over the last week. Likely ankle sprain based on clinical exam and negative x-rays. Will place on crutches, RICE therapy, follow-up with primary care or orthopedics.   Patient understands and agrees with this plan.      Diagnosis and Disposition     DISCHARGE NOTE:  2:18 AM   Flex Walker  results have been reviewed with him. He has been counseled regarding his diagnosis, treatment, and plan. He verbally conveys understanding and agreement of the signs, symptoms, diagnosis, treatment and prognosis and additionally agrees to follow up as discussed. He also agrees with the care-plan and conveys that all of his questions have been answered. I have also provided discharge instructions for him that include: educational information regarding their diagnosis and treatment, and list of reasons why they would want to return to the ED prior to their follow-up appointment, should his condition change. He has been provided with education for proper emergency department utilization. CLINICAL IMPRESSION:    1. Sprain of right ankle, unspecified ligament, initial encounter        PLAN:  1. D/C Home  2. Current Discharge Medication List        3. Follow-up Information       Follow up With Specialties Details Why Mykel Allen DO Internal Medicine Physician Schedule an appointment as soon as possible for a visit  As soon as possible, For follow up from Emergency Department visit. [address] [facility] MD [place] Orthopedic Surgery Schedule an appointment as soon as possible for a visit  As soon as possible, For follow up from Emergency Department visit. [address]      [facility] EMERGENCY DEPT Emergency Medicine  As needed; If symptoms worsen [address] [phone] President, MD am the primary clinician of record. Shaheed Disclaimer     Please note that this dictation was completed with [employer]ero, the computer voice recognition software.   Quite often unanticipated grammatical, syntax, homophones, and other interpretive errors are inadvertently transcribed by the computer software. Please disregard these errors. Please excuse any errors that have escaped final proofreading.     Jovita Ureña MD

## 2022-12-06 NOTE — ED TRIAGE NOTES
Pt arrived with c/o sudden onset of left foot swelling. Pt states he was laying down watching tv yesterday when his foot swelled up. Pt states he didn't think much of it until today when he noted a sharp pain on the medial aspect of his ankle and moves up his leg. Pt is ambulatory. Pt denies any new injury. Pt broke same foot 2 years ago and has a screw in that foot. Pt is alert and oriented x4.